# Patient Record
Sex: MALE | Race: WHITE | NOT HISPANIC OR LATINO | URBAN - METROPOLITAN AREA
[De-identification: names, ages, dates, MRNs, and addresses within clinical notes are randomized per-mention and may not be internally consistent; named-entity substitution may affect disease eponyms.]

---
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
## 2022-06-10 ENCOUNTER — NON-APPOINTMENT (OUTPATIENT)
Age: 22
End: 2022-06-10

## 2022-06-10 DIAGNOSIS — R59.0 LOCALIZED ENLARGED LYMPH NODES: ICD-10-CM

## 2022-06-10 PROBLEM — Z00.00 ENCOUNTER FOR PREVENTIVE HEALTH EXAMINATION: Status: ACTIVE | Noted: 2022-06-10

## 2022-06-20 ENCOUNTER — APPOINTMENT (OUTPATIENT)
Dept: FAMILY MEDICINE | Facility: CLINIC | Age: 22
End: 2022-06-20

## 2024-04-23 NOTE — ED PROVIDER NOTE - CPE EDP GASTRO NORM
How Severe Is Your Skin Lesion?: moderate
Has Your Skin Lesion Been Treated?: not been treated
normal (ped)...
Is This A New Presentation, Or A Follow-Up?: Skin Lesions

## 2025-04-30 NOTE — ED PROVIDER NOTE - NS CPE EDP EYE EXAM BOTH DETAIL
Sent to PCP to advise on MRI of right hip with or without contrast. Ortho referral pended.   uncorrected

## 2025-07-31 NOTE — ED PEDIATRIC TRIAGE NOTE - TEMP(CELSIUS)
7/31/25 CM spoke with pt's sister ( Patt Farrell @ [phone]) as requested by pt & per sister pt's home is being condemned & pt will need to move by 8/15 & sister requesting assist with housing . Sister informed that CM can supply community resources to pt/family & sister could contact . . Sister also requested SNF placement until housing could be found - stated she has no room in her home forpt to live with her.  Sister informed that pt declined placement & that pt only had short term coverage. Per sister ( Patt) she will speak with pt regarding SNF & f/u with CM.    36.9